# Patient Record
Sex: MALE | Race: WHITE | NOT HISPANIC OR LATINO | URBAN - METROPOLITAN AREA
[De-identification: names, ages, dates, MRNs, and addresses within clinical notes are randomized per-mention and may not be internally consistent; named-entity substitution may affect disease eponyms.]

---

## 2020-10-29 ENCOUNTER — OFFICE VISIT (OUTPATIENT)
Dept: URBAN - METROPOLITAN AREA CLINIC 80 | Facility: CLINIC | Age: 80
End: 2020-10-29

## 2020-10-29 RX ORDER — MESALAMINE 500 MG/1
TAKE 2 CAPSULES (1,000 MG) BY ORAL ROUTE 4 TIMES PER DAY CAPSULE ORAL
Qty: 0 | Refills: 0 | Status: ACTIVE | COMMUNITY
Start: 1900-01-01

## 2020-10-29 RX ORDER — HYDRALAZINE HYDROCHLORIDE 100 MG/1
TABLET ORAL
Qty: 0 | Refills: 0 | Status: ACTIVE | COMMUNITY
Start: 1900-01-01

## 2020-10-29 RX ORDER — SPIRONOLACTONE 25 MG/1
TABLET ORAL
Qty: 0 | Refills: 0 | Status: ACTIVE | COMMUNITY
Start: 1900-01-01

## 2021-01-05 ENCOUNTER — ERX REFILL RESPONSE (OUTPATIENT)
Age: 81
End: 2021-01-05

## 2021-01-05 RX ORDER — AZATHIOPRINE 50 MG/1
TAKE ONE TABLET BY MOUTH ONE TIME DAILY TABLET ORAL
Qty: 90 | Refills: 2

## 2021-04-20 ENCOUNTER — TELEPHONE ENCOUNTER (OUTPATIENT)
Dept: URBAN - METROPOLITAN AREA CLINIC 5 | Facility: CLINIC | Age: 81
End: 2021-04-20

## 2021-05-25 ENCOUNTER — OFFICE VISIT (OUTPATIENT)
Dept: URBAN - METROPOLITAN AREA CLINIC 128 | Facility: CLINIC | Age: 81
End: 2021-05-25
Payer: MEDICARE

## 2021-05-25 DIAGNOSIS — Z86.010 PERSONAL HISTORY OF COLONIC POLYPS: ICD-10-CM

## 2021-05-25 DIAGNOSIS — R19.7 DIARRHEA, UNSPECIFIED TYPE: ICD-10-CM

## 2021-05-25 DIAGNOSIS — K50.80 CROHN'S DISEASE OF BOTH SMALL AND LARGE INTESTINE WITHOUT COMPLICATION: ICD-10-CM

## 2021-05-25 PROBLEM — 428283002: Status: ACTIVE | Noted: 2021-05-25

## 2021-05-25 PROCEDURE — 99214 OFFICE O/P EST MOD 30 MIN: CPT | Performed by: INTERNAL MEDICINE

## 2021-05-25 RX ORDER — MESALAMINE 500 MG/1
TAKE 2 CAPSULES (1,000 MG) BY ORAL ROUTE 4 TIMES PER DAY CAPSULE ORAL
Qty: 0 | Refills: 0 | Status: DISCONTINUED | COMMUNITY
Start: 1900-01-01

## 2021-05-25 RX ORDER — SPIRONOLACTONE 25 MG/1
TABLET ORAL
Qty: 0 | Refills: 0 | Status: ACTIVE | COMMUNITY
Start: 1900-01-01

## 2021-05-25 RX ORDER — HYDRALAZINE HYDROCHLORIDE 100 MG/1
TABLET ORAL
Qty: 0 | Refills: 0 | Status: ACTIVE | COMMUNITY
Start: 1900-01-01

## 2021-05-25 RX ORDER — AMLODIPINE BESYLATE 10 MG/1
1 TABLET TABLET ORAL BID
Status: ACTIVE | COMMUNITY

## 2021-05-25 RX ORDER — AZATHIOPRINE 50 MG/1
TAKE ONE TABLET BY MOUTH ONE TIME DAILY TABLET ORAL
Qty: 90 | Refills: 2 | Status: ACTIVE | COMMUNITY

## 2021-05-25 RX ORDER — SODIUM, POTASSIUM,MAG SULFATES 17.5-3.13G
AS DIRECTED SOLUTION, RECONSTITUTED, ORAL ORAL ONCE
Qty: 1 KIT | Refills: 0 | OUTPATIENT
Start: 2021-05-25 | End: 2021-05-26

## 2021-05-25 NOTE — HPI-TODAY'S VISIT:
Pleasant 79yo gentleman here for a follow up visit.  He is due for colonoscopy for IBD and adenoma surveillance.  He offers an episode of right abdominal pain and fullness for a day that was followed by diarrhea.  He offers that he hasnt had an episode of pain like this in over 10 years.  BMs have been watery or loose every since -- 2-3 times a day. No blood or melena.  No antibiotics in over a year, no new meds, no travel, no similar illness in family or contacts.  He offers compliant use of imuran 50mg a day.  Copy of labwork from 5/11/21 provided and appears all normal -- CMP and CBC -- except chronic elevation of Cr at 1.4.

## 2021-05-26 ENCOUNTER — LAB OUTSIDE AN ENCOUNTER (OUTPATIENT)
Dept: URBAN - METROPOLITAN AREA CLINIC 128 | Facility: CLINIC | Age: 81
End: 2021-05-26

## 2021-05-26 PROBLEM — 71833008: Status: ACTIVE | Noted: 2021-05-25

## 2021-06-08 LAB
CAMPYLOBACTER CULTURE: (no result)
E COLI SHIGA TOXIN EIA: NEGATIVE
FATS, NEUTRAL: NORMAL
FATS, TOTAL: (no result)
GIARDIA LAMBLIA AG, EIA: NEGATIVE
Lab: (no result)
OVA + PARASITE EXAM: (no result)
OVA + PARASITE EXAM: (no result)
SALMONELLA/SHIGELLA SCREEN: (no result)
WHITE BLOOD CELLS (WBC), STOOL: (no result)

## 2021-06-16 ENCOUNTER — TELEPHONE ENCOUNTER (OUTPATIENT)
Dept: URBAN - METROPOLITAN AREA CLINIC 92 | Facility: CLINIC | Age: 81
End: 2021-06-16

## 2021-07-01 ENCOUNTER — OFFICE VISIT (OUTPATIENT)
Dept: URBAN - METROPOLITAN AREA SURGERY CENTER 31 | Facility: SURGERY CENTER | Age: 81
End: 2021-07-01
Payer: MEDICARE

## 2021-07-01 ENCOUNTER — CLAIMS CREATED FROM THE CLAIM WINDOW (OUTPATIENT)
Dept: URBAN - METROPOLITAN AREA CLINIC 4 | Facility: CLINIC | Age: 81
End: 2021-07-01
Payer: MEDICARE

## 2021-07-01 DIAGNOSIS — K63.89 POLYP OF ILEUM: ICD-10-CM

## 2021-07-01 DIAGNOSIS — D12.4 BENIGN NEOPLASM OF DESCENDING COLON: ICD-10-CM

## 2021-07-01 DIAGNOSIS — D12.4 ADENOMA OF DESCENDING COLON: ICD-10-CM

## 2021-07-01 DIAGNOSIS — Z86.010 PERSONAL HISTORY OF COLONIC POLYPS: ICD-10-CM

## 2021-07-01 PROCEDURE — 45380 COLONOSCOPY AND BIOPSY: CPT | Performed by: INTERNAL MEDICINE

## 2021-07-01 PROCEDURE — G9807 PT NO RECD CERV CYTO/HPV: HCPCS | Performed by: INTERNAL MEDICINE

## 2021-07-01 PROCEDURE — 88305 TISSUE EXAM BY PATHOLOGIST: CPT | Performed by: PATHOLOGY

## 2021-09-30 ENCOUNTER — OFFICE VISIT (OUTPATIENT)
Dept: URBAN - METROPOLITAN AREA CLINIC 128 | Facility: CLINIC | Age: 81
End: 2021-09-30

## 2021-09-30 ENCOUNTER — WEB ENCOUNTER (OUTPATIENT)
Dept: URBAN - METROPOLITAN AREA CLINIC 128 | Facility: CLINIC | Age: 81
End: 2021-09-30

## 2021-09-30 RX ORDER — AZATHIOPRINE 50 MG/1
TAKE ONE TABLET BY MOUTH ONE TIME DAILY TABLET ORAL
Qty: 90 | Refills: 2 | Status: ACTIVE | COMMUNITY

## 2021-09-30 RX ORDER — HYDRALAZINE HYDROCHLORIDE 100 MG/1
TABLET ORAL
Qty: 0 | Refills: 0 | Status: ACTIVE | COMMUNITY
Start: 1900-01-01

## 2021-09-30 RX ORDER — AMLODIPINE BESYLATE 10 MG/1
1 TABLET TABLET ORAL BID
Status: ACTIVE | COMMUNITY

## 2021-09-30 RX ORDER — SPIRONOLACTONE 25 MG/1
TABLET ORAL
Qty: 0 | Refills: 0 | Status: ACTIVE | COMMUNITY
Start: 1900-01-01

## 2021-10-25 ENCOUNTER — ERX REFILL RESPONSE (OUTPATIENT)
Dept: URBAN - METROPOLITAN AREA CLINIC 128 | Facility: CLINIC | Age: 81
End: 2021-10-25

## 2021-10-25 RX ORDER — AZATHIOPRINE 50 1/1
TAKE ONE TABLET BY MOUTH ONE TIME DAILY TABLET ORAL
Qty: 90 TABLET | Refills: 3 | OUTPATIENT

## 2021-10-25 RX ORDER — AZATHIOPRINE 50 MG/1
TAKE ONE TABLET BY MOUTH ONE TIME DAILY TABLET ORAL
Qty: 90 | Refills: 2 | OUTPATIENT

## 2022-10-31 ENCOUNTER — TELEPHONE ENCOUNTER (OUTPATIENT)
Dept: URBAN - METROPOLITAN AREA CLINIC 128 | Facility: CLINIC | Age: 82
End: 2022-10-31

## 2022-11-16 ENCOUNTER — ERX REFILL RESPONSE (OUTPATIENT)
Dept: URBAN - METROPOLITAN AREA CLINIC 128 | Facility: CLINIC | Age: 82
End: 2022-11-16

## 2022-11-16 RX ORDER — AZATHIOPRINE 50 1/1
TAKE ONE TABLET BY MOUTH ONE TIME DAILY TABLET ORAL
Qty: 90 TABLET | Refills: 3 | OUTPATIENT

## 2022-12-01 ENCOUNTER — DASHBOARD ENCOUNTERS (OUTPATIENT)
Age: 82
End: 2022-12-01

## 2022-12-01 ENCOUNTER — OFFICE VISIT (OUTPATIENT)
Dept: URBAN - METROPOLITAN AREA CLINIC 128 | Facility: CLINIC | Age: 82
End: 2022-12-01
Payer: MEDICARE

## 2022-12-01 VITALS
BODY MASS INDEX: 28.2 KG/M2 | WEIGHT: 197 LBS | TEMPERATURE: 97.5 F | SYSTOLIC BLOOD PRESSURE: 131 MMHG | HEART RATE: 66 BPM | DIASTOLIC BLOOD PRESSURE: 62 MMHG | HEIGHT: 70 IN

## 2022-12-01 DIAGNOSIS — K50.819 CROHN'S DISEASE OF SMALL AND LARGE INTESTINES WITH COMPLICATION: ICD-10-CM

## 2022-12-01 DIAGNOSIS — K52.9 ACUTE AND CHRONIC COLITIS: ICD-10-CM

## 2022-12-01 DIAGNOSIS — R63.4 WEIGHT LOSS: ICD-10-CM

## 2022-12-01 PROBLEM — 32230006: Status: ACTIVE | Noted: 2021-06-16

## 2022-12-01 PROCEDURE — 99214 OFFICE O/P EST MOD 30 MIN: CPT | Performed by: PHYSICIAN ASSISTANT

## 2022-12-01 RX ORDER — ASCORBIC ACID 1000 MG
1 CAPSULE TABLET ORAL ONCE A DAY
Status: ACTIVE | COMMUNITY

## 2022-12-01 RX ORDER — BUDESONIDE 3 MG/1
3 CAPSULES CAPSULE, DELAYED RELEASE PELLETS ORAL ONCE A DAY
Qty: 168 CAPSULE | Refills: 0 | OUTPATIENT
Start: 2022-12-01

## 2022-12-01 RX ORDER — SPIRONOLACTONE 25 MG/1
TABLET ORAL
Qty: 0 | Refills: 0 | Status: ACTIVE | COMMUNITY
Start: 1900-01-01

## 2022-12-01 RX ORDER — AZATHIOPRINE 50 1/1
TAKE ONE TABLET BY MOUTH ONE TIME DAILY TABLET ORAL
Qty: 90 TABLET | Refills: 3 | Status: ACTIVE | COMMUNITY

## 2022-12-01 RX ORDER — AMLODIPINE BESYLATE 10 MG/1
1 TABLET TABLET ORAL BID
Status: ON HOLD | COMMUNITY

## 2022-12-01 RX ORDER — HYDRALAZINE HYDROCHLORIDE 100 MG/1
TABLET ORAL
Qty: 0 | Refills: 0 | Status: ACTIVE | COMMUNITY
Start: 1900-01-01

## 2022-12-01 RX ORDER — DORZOLAMIDE HCL 20 MG/ML
1 DROP INTO AFFECTED EYE SOLUTION/ DROPS OPHTHALMIC THREE TIMES A DAY
Status: ACTIVE | COMMUNITY

## 2022-12-01 RX ORDER — PRAMIPEXOLE DIHYDROCHLORIDE 0.25 MG/1
1 TABLET TABLET ORAL ONCE A DAY
Status: ACTIVE | COMMUNITY

## 2022-12-01 NOTE — HPI-TODAY'S VISIT:
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 He reports watery, somtimes loose diarrhea lasting a few weeks intermittently over the past year. Can have up to 4 episodes per day and then stools going back to normal consistency twice per day. No diet changes but notices when he drinks soda this can trigger symptoms. Denies any bloody or black tarry stools. Denies abdominal pain. Denies any nausea or vomiting. Weight loss occurs when he has diarrhea episodes and he estimates losing up to 10 pounds during this time but gains back when his stools return to normal. He states his diarrhea and weight have improved now that he made this appointment.  Last colonoscopy 07/2021 with tubular adenoma and otherwise normal path showing quiescient colitis and he was told to repeat it in 2 years.

## 2022-12-06 ENCOUNTER — TELEPHONE ENCOUNTER (OUTPATIENT)
Dept: URBAN - METROPOLITAN AREA CLINIC 92 | Facility: CLINIC | Age: 82
End: 2022-12-06

## 2022-12-06 LAB
A/G RATIO: 1.5
ABSOLUTE BASOPHILS: 9
ABSOLUTE EOSINOPHILS: 32
ABSOLUTE LYMPHOCYTES: 1495
ABSOLUTE MONOCYTES: 285
ABSOLUTE NEUTROPHILS: 2778
ALBUMIN: 3.7
ALKALINE PHOSPHATASE: 69
ALT (SGPT): 20
AST (SGOT): 16
BASOPHILS: 0.2
BILIRUBIN, TOTAL: 0.6
BUN/CREATININE RATIO: 11
BUN: 16
C-REACTIVE PROTEIN, QUANT: 1.4
CA 19-9: 4
CALCIUM: 8.4
CARBON DIOXIDE, TOTAL: 23
CEA: 13.3
CHLORIDE: 107
CREATININE: 1.42
EGFR: 50
EOSINOPHILS: 0.7
GLOBULIN, TOTAL: 2.5
GLUCOSE: 190
HEMATOCRIT: 38
HEMOGLOBIN: 12.9
IMMUNOGLOBULIN A: 329
INTERPRETATION: (no result)
LYMPHOCYTES: 32.5
MCH: 30.4
MCHC: 33.9
MCV: 89.6
MONOCYTES: 6.2
MPV: 10.7
NEUTROPHILS: 60.4
PLATELET COUNT: 188
POTASSIUM: 3.9
PROTEIN, TOTAL: 6.2
RDW: 13.9
RED BLOOD CELL COUNT: 4.24
SED RATE BY MODIFIED: 11
SODIUM: 139
TISSUE TRANSGLUTAMINASE AB, IGA: <1
TSH: 2.48
WHITE BLOOD CELL COUNT: 4.6

## 2022-12-07 ENCOUNTER — LAB OUTSIDE AN ENCOUNTER (OUTPATIENT)
Dept: URBAN - METROPOLITAN AREA CLINIC 128 | Facility: CLINIC | Age: 82
End: 2022-12-07

## 2022-12-07 ENCOUNTER — TELEPHONE ENCOUNTER (OUTPATIENT)
Dept: URBAN - METROPOLITAN AREA CLINIC 19 | Facility: CLINIC | Age: 82
End: 2022-12-07

## 2022-12-14 LAB
CALPROTECTIN, FECAL: 30
CAMPYLOBACTER SPP. AG,EIA: (no result)
CLOSTRIDIUM DIFFICILE: (no result)
LEUKOCYTES: (no result)
OVA AND PARASITES, CONC AND PERM SMEAR: (no result)
PANCREATIC ELASTASE, FECAL: 253
SALMONELLA AND SHIGELLA, CULTURE: (no result)
SHIGA TOXINS, EIA W/RFL TO E.COLI O157 CULTURE: (no result)

## 2022-12-30 ENCOUNTER — LAB OUTSIDE AN ENCOUNTER (OUTPATIENT)
Dept: URBAN - METROPOLITAN AREA CLINIC 128 | Facility: CLINIC | Age: 82
End: 2022-12-30

## 2023-02-09 PROBLEM — 71833008: Status: ACTIVE | Noted: 2021-06-16

## 2023-04-06 ENCOUNTER — OFFICE VISIT (OUTPATIENT)
Dept: URBAN - METROPOLITAN AREA SURGERY CENTER 31 | Facility: SURGERY CENTER | Age: 83
End: 2023-04-06

## 2023-07-06 ENCOUNTER — OFFICE VISIT (OUTPATIENT)
Dept: URBAN - METROPOLITAN AREA SURGERY CENTER 31 | Facility: SURGERY CENTER | Age: 83
End: 2023-07-06